# Patient Record
Sex: FEMALE | URBAN - METROPOLITAN AREA
[De-identification: names, ages, dates, MRNs, and addresses within clinical notes are randomized per-mention and may not be internally consistent; named-entity substitution may affect disease eponyms.]

---

## 2017-08-24 ENCOUNTER — APPOINTMENT (RX ONLY)
Dept: URBAN - METROPOLITAN AREA CLINIC 141 | Facility: CLINIC | Age: 32
Setting detail: DERMATOLOGY
End: 2017-08-24

## 2017-08-24 DIAGNOSIS — Z41.9 ENCOUNTER FOR PROCEDURE FOR PURPOSES OTHER THAN REMEDYING HEALTH STATE, UNSPECIFIED: ICD-10-CM

## 2017-08-24 PROCEDURE — ? LIGHTSHEER

## 2017-08-24 ASSESSMENT — LOCATION SIMPLE DESCRIPTION DERM: LOCATION SIMPLE: LEFT LIP

## 2017-08-24 ASSESSMENT — LOCATION ZONE DERM: LOCATION ZONE: LIP

## 2017-08-24 ASSESSMENT — LOCATION DETAILED DESCRIPTION DERM: LOCATION DETAILED: LEFT UPPER CUTANEOUS LIP

## 2017-08-24 NOTE — PROCEDURE: LIGHTSHEER
Skin Type (Optional): II
Laser Type: Jayleen VERA
Hair Color (Optional): Brown
Hair Texture (Optional): fine
Detail Level: Detailed
Spot Size: 9 mm
Consent: Written consent obtained, risks reviewed including but not limited to crusting, scabbing, blistering, scarring, darker or lighter pigmentary change, paradoxical hair regrowth, incomplete removal of hair and infection.
External Cooling Fan Speed: 0
Total Pulses: 1
Fluence: 31
Endpoint: Immediate endpoint: perifollicular erythema and edema. Vaseline and ice applied. Post care reviewed with patient.
Post-Care Instructions: I reviewed with the patient in detail post-care instructions. Patient should avoid sun for a minimum of 4 weeks before and after treatment.

## 2017-10-05 ENCOUNTER — APPOINTMENT (RX ONLY)
Dept: URBAN - METROPOLITAN AREA CLINIC 141 | Facility: CLINIC | Age: 32
Setting detail: DERMATOLOGY
End: 2017-10-05

## 2017-10-05 DIAGNOSIS — Z41.9 ENCOUNTER FOR PROCEDURE FOR PURPOSES OTHER THAN REMEDYING HEALTH STATE, UNSPECIFIED: ICD-10-CM

## 2017-10-05 PROCEDURE — ? LIGHTSHEER

## 2017-10-05 ASSESSMENT — LOCATION ZONE DERM: LOCATION ZONE: FACE

## 2017-10-05 ASSESSMENT — LOCATION DETAILED DESCRIPTION DERM: LOCATION DETAILED: LEFT INFERIOR MEDIAL MALAR CHEEK

## 2017-10-05 ASSESSMENT — LOCATION SIMPLE DESCRIPTION DERM: LOCATION SIMPLE: LEFT CHEEK

## 2017-10-05 NOTE — PROCEDURE: LIGHTSHEER
Endpoint: Immediate endpoint: perifollicular erythema and edema. Vaseline and ice applied. Post care reviewed with patient.
Total Pulses: 1
Hair Texture (Optional): fine
Post-Care Instructions: I reviewed with the patient in detail post-care instructions. Patient should avoid sun for a minimum of 4 weeks before and after treatment.
Detail Level: Detailed
Skin Type (Optional): II
External Cooling Fan Speed: 0
Consent: Written consent obtained, risks reviewed including but not limited to crusting, scabbing, blistering, scarring, darker or lighter pigmentary change, paradoxical hair regrowth, incomplete removal of hair and infection.
Hair Color (Optional): Brown
Spot Size: 9 mm
Fluence: 40
Laser Type: Jayleen VERA